# Patient Record
Sex: MALE | Race: WHITE | NOT HISPANIC OR LATINO | Employment: FULL TIME | URBAN - METROPOLITAN AREA
[De-identification: names, ages, dates, MRNs, and addresses within clinical notes are randomized per-mention and may not be internally consistent; named-entity substitution may affect disease eponyms.]

---

## 2019-07-03 ENCOUNTER — OFFICE VISIT (OUTPATIENT)
Dept: URGENT CARE | Facility: CLINIC | Age: 34
End: 2019-07-03
Payer: COMMERCIAL

## 2019-07-03 ENCOUNTER — APPOINTMENT (OUTPATIENT)
Dept: RADIOLOGY | Facility: CLINIC | Age: 34
End: 2019-07-03
Payer: COMMERCIAL

## 2019-07-03 VITALS
TEMPERATURE: 97.1 F | RESPIRATION RATE: 18 BRPM | BODY MASS INDEX: 19.88 KG/M2 | HEART RATE: 92 BPM | HEIGHT: 73 IN | WEIGHT: 150 LBS | DIASTOLIC BLOOD PRESSURE: 60 MMHG | OXYGEN SATURATION: 100 % | SYSTOLIC BLOOD PRESSURE: 108 MMHG

## 2019-07-03 DIAGNOSIS — S62.357A CLOSED NONDISPLACED FRACTURE OF SHAFT OF FIFTH METACARPAL BONE OF LEFT HAND, INITIAL ENCOUNTER: Primary | ICD-10-CM

## 2019-07-03 DIAGNOSIS — S69.92XA INJURY OF LEFT HAND, INITIAL ENCOUNTER: ICD-10-CM

## 2019-07-03 PROCEDURE — 99213 OFFICE O/P EST LOW 20 MIN: CPT | Performed by: PHYSICIAN ASSISTANT

## 2019-07-03 PROCEDURE — 29125 APPL SHORT ARM SPLINT STATIC: CPT | Performed by: PHYSICIAN ASSISTANT

## 2019-07-03 PROCEDURE — 73130 X-RAY EXAM OF HAND: CPT

## 2019-07-03 NOTE — PROGRESS NOTES
[facility]    NAME: Terrence Li is a 28 y o  male  : 1985    MRN: [id number]  DATE: July 3, 2019  TIME: 10:35 AM    Assessment and Plan   Closed nondisplaced fracture of shaft of fifth metacarpal bone of left hand, initial encounter [S6 357A]  1  Closed nondisplaced fracture of shaft of fifth metacarpal bone of left hand, initial encounter  XR hand 3+ vw left    Ambulatory referral to Hand Surgery    Splint application     X-ray left hand:  Spiral fracture distal shaft 5th metacarpal no significant displacement  Splint applied by nursing staff to patient's left hand and wrist     Staff to help arrange orthopedic appointment for further treatment evaluation fracture care  Patient Instructions   Patient Instructions   Fracture 5th metacarpal   Wear splint for protection  Ortho evaluation  Chief Complaint     Chief Complaint   Patient presents with    Hand Injury     Pt stats 1 week ago while playing volley ball injuried left hand  Pt states he has been using ice and ibuprofen w/o relief  Continues to have pain, swelling and ecchymosis  History of Present Illness   Terrence Li presents to the clinic c/o  71-year-old right-hand-dominant male with pain swelling left hand  Was playing volleyball about a week ago and went up to take a block  Hyperextended fingers of left hand  Fingers are not painful but has more discomfort in the palm of his hand  Swelling, contusion  Difficulty gripping fully with that hand  Had to take his wedding ring off due to swelling going into fingers  Review of Systems   Review of Systems   Constitutional: Positive for activity change  Negative for appetite change, chills and fever  Musculoskeletal: Positive for arthralgias and joint swelling  Skin: Positive for color change  Current Medications     No long-term medications on file         Current Allergies     Allergies as of 2019    (No Known Allergies)          The following portions of the patient's history were reviewed and updated as appropriate: allergies, current medications, past family history, past medical history, past social history, past surgical history and problem list   History reviewed  No pertinent past medical history  History reviewed  No pertinent surgical history  History reviewed  No pertinent family history  Objective   /60 (BP Location: Left arm, Patient Position: Sitting)   Pulse 92   Temp (!) 97 1 °F (36 2 °C) (Tympanic)   Resp 18   Ht 6' 1" (1 854 m)   Wt 68 kg (150 lb)   SpO2 100%   BMI 19 79 kg/m²   No LMP for male patient  Physical Exam     Physical Exam   Constitutional: He is oriented to person, place, and time  He appears well-developed and well-nourished  No distress  Cardiovascular: Normal rate and regular rhythm  Pulmonary/Chest: Effort normal    Musculoskeletal: He exhibits edema and tenderness  Left hand: He exhibits tenderness, bony tenderness and swelling  He exhibits no deformity  Decreased strength noted  Hands:  Neurological: He is alert and oriented to person, place, and time  Skin: Skin is warm and dry  He is not diaphoretic  Contusion L  Hand, ulnar aspect  Psychiatric: He has a normal mood and affect  Nursing note and vitals reviewed        Procedures

## 2020-05-01 ENCOUNTER — APPOINTMENT (OUTPATIENT)
Dept: RADIOLOGY | Facility: CLINIC | Age: 35
End: 2020-05-01
Payer: COMMERCIAL

## 2020-05-01 ENCOUNTER — OFFICE VISIT (OUTPATIENT)
Dept: URGENT CARE | Facility: CLINIC | Age: 35
End: 2020-05-01
Payer: COMMERCIAL

## 2020-05-01 VITALS
TEMPERATURE: 98 F | OXYGEN SATURATION: 97 % | RESPIRATION RATE: 18 BRPM | SYSTOLIC BLOOD PRESSURE: 127 MMHG | DIASTOLIC BLOOD PRESSURE: 60 MMHG | HEART RATE: 78 BPM

## 2020-05-01 DIAGNOSIS — S93.602A SPRAIN OF LEFT FOOT, INITIAL ENCOUNTER: Primary | ICD-10-CM

## 2020-05-01 DIAGNOSIS — S99.922A INJURY OF LEFT FOOT, INITIAL ENCOUNTER: ICD-10-CM

## 2020-05-01 PROCEDURE — 73630 X-RAY EXAM OF FOOT: CPT

## 2020-05-01 PROCEDURE — 99213 OFFICE O/P EST LOW 20 MIN: CPT | Performed by: PHYSICIAN ASSISTANT

## 2021-08-09 ENCOUNTER — OFFICE VISIT (OUTPATIENT)
Dept: URGENT CARE | Facility: CLINIC | Age: 36
End: 2021-08-09
Payer: COMMERCIAL

## 2021-08-09 VITALS
SYSTOLIC BLOOD PRESSURE: 110 MMHG | HEART RATE: 76 BPM | DIASTOLIC BLOOD PRESSURE: 68 MMHG | OXYGEN SATURATION: 99 % | RESPIRATION RATE: 18 BRPM | TEMPERATURE: 97 F | HEIGHT: 73 IN | BODY MASS INDEX: 20.28 KG/M2 | WEIGHT: 153 LBS

## 2021-08-09 DIAGNOSIS — L23.7 POISON IVY DERMATITIS: Primary | ICD-10-CM

## 2021-08-09 PROCEDURE — 99213 OFFICE O/P EST LOW 20 MIN: CPT | Performed by: NURSE PRACTITIONER

## 2021-08-09 RX ORDER — METHYLPREDNISOLONE 4 MG/1
TABLET ORAL
Qty: 1 EACH | Refills: 0 | Status: SHIPPED | OUTPATIENT
Start: 2021-08-09

## 2021-08-09 NOTE — PROGRESS NOTES
[facility]        NAME: Annemarie Pradhan is a 38 y o  male  : 1985    MRN: [id number]  DATE: 2021  TIME: 5:09 PM    Assessment and Plan   Poison ivy dermatitis [L23 7]  1  Poison ivy dermatitis  methylPREDNISolone 4 MG tablet therapy pack    hydrocortisone 2 5 % cream     Start course of medrol dose pack and topical steroid prn  Reviewed side effects of medication   Pt in agreement with plan    Patient Instructions     Follow up with PCP in 3-5 days  Proceed to  ER if symptoms worsen  Chief Complaint     Chief Complaint   Patient presents with    Rash     Rash to generalized body for a few days  Says that he was weeding at his parents and ended up with poison  Using OTC anti itch spray         History of Present Illness   Annemarie Pradhan presents to the clinic c/o    Rash (Rash to generalized body for a few days  Says that he was weeding at his parents and ended up with poison  Using OTC anti itch spray)  Has rash on arms, legs, chest          Review of Systems   Review of Systems   All other systems reviewed and are negative  Current Medications     Long-Term Medications   Medication Sig Dispense Refill    hydrocortisone 2 5 % cream Apply topically 2 (two) times a day 30 g 0       Current Allergies     Allergies as of 2021 - Reviewed 2021   Allergen Reaction Noted    Lactase  2016            The following portions of the patient's history were reviewed and updated as appropriate: allergies, current medications, past family history, past medical history, past social history, past surgical history and problem list     Objective   /68   Pulse 76   Temp (!) 97 °F (36 1 °C) (Tympanic)   Resp 18   Ht 6' 1" (1 854 m)   Wt 69 4 kg (153 lb)   SpO2 99%   BMI 20 19 kg/m²        Physical Exam     Physical Exam  Vitals and nursing note reviewed  Constitutional:       Appearance: Normal appearance  He is well-developed     HENT:      Head: Normocephalic and atraumatic  Eyes:      General: Lids are normal       Conjunctiva/sclera: Conjunctivae normal       Pupils: Pupils are equal, round, and reactive to light  Cardiovascular:      Rate and Rhythm: Normal rate and regular rhythm  Heart sounds: Normal heart sounds, S1 normal and S2 normal    Pulmonary:      Effort: Pulmonary effort is normal       Breath sounds: Normal breath sounds  Skin:     General: Skin is warm and dry  Neurological:      Mental Status: He is alert  Psychiatric:         Speech: Speech normal          Behavior: Behavior normal          Thought Content:  Thought content normal          Judgment: Judgment normal

## 2021-08-09 NOTE — PATIENT INSTRUCTIONS
Contact Dermatitis   WHAT YOU NEED TO KNOW:   Contact dermatitis is a skin rash  It develops when you touch something that irritates your skin or causes an allergic reaction  DISCHARGE INSTRUCTIONS:   Call 911 for any of the following:   · You have sudden trouble breathing  · Your throat swells and you have trouble eating  · Your face is swollen  Contact your healthcare provider if:   · You have a fever  · Your blisters are draining pus  · Your rash spreads or does not get better, even after treatment  · You have questions or concerns about your condition or care  Medicines:   · Medicines  help decrease itching and swelling  They will be given as a topical medicine to apply to your rash or as a pill  · Take your medicine as directed  Contact your healthcare provider if you think your medicine is not helping or if you have side effects  Tell him or her if you are allergic to any medicine  Keep a list of the medicines, vitamins, and herbs you take  Include the amounts, and when and why you take them  Bring the list or the pill bottles to follow-up visits  Carry your medicine list with you in case of an emergency  Manage contact dermatitis:   · Take short baths or showers in cool water  Use mild soap or soap-free cleansers  Add oatmeal, baking soda, or cornstarch to the bath water to help decrease skin irritation  · Avoid skin irritants , such as makeup, hair products, soaps, and cleansers  Use products that do not contain perfume or dye  · Apply a cool compress to your rash  This will help soothe your skin  · Keep your skin moist   Rub unscented cream or lotion on your skin to prevent dryness and itching  Do this right after a bath or shower when your skin is still damp  Follow up with your healthcare provider or dermatologist in 2 to 3 days:  Write down your questions so you remember to ask them during your visits     © Copyright [employer] 2021 Information is for End User's use only and may not be sold, redistributed or otherwise used for commercial purposes  All illustrations and images included in CareNotes® are the copyrighted property of [employer]  or [employer]  The above information is an  only  It is not intended as medical advice for individual conditions or treatments  Talk to your doctor, nurse or pharmacist before following any medical regimen to see if it is safe and effective for you